# Patient Record
Sex: FEMALE | URBAN - METROPOLITAN AREA
[De-identification: names, ages, dates, MRNs, and addresses within clinical notes are randomized per-mention and may not be internally consistent; named-entity substitution may affect disease eponyms.]

---

## 2018-05-09 ENCOUNTER — APPOINTMENT (RX ONLY)
Dept: URBAN - METROPOLITAN AREA CLINIC 100 | Facility: CLINIC | Age: 83
Setting detail: DERMATOLOGY
End: 2018-05-09

## 2018-05-09 DIAGNOSIS — Z41.9 ENCOUNTER FOR PROCEDURE FOR PURPOSES OTHER THAN REMEDYING HEALTH STATE, UNSPECIFIED: ICD-10-CM

## 2018-05-09 PROCEDURE — ? FILLERS

## 2018-05-09 PROCEDURE — ? DYSPORT

## 2018-05-09 NOTE — PROCEDURE: DYSPORT
Lateral Platysmal Bands Units: 0
Additional Area 4 Location: neck bands
Additional Area 3 Location: chin
Additional Area 2 Units: 10
Additional Area 1 Location: glabella
Expiration Date (Month Year): 09/2018
Consent: Written consent obtained. Risks include but not limited to lid/brow ptosis, bruising, swelling, diplopia, temporary effect, incomplete chemical denervation.
Detail Level: Zone
Additional Area 6 Location: neck
Price (Use Numbers Only, No Special Characters Or $): 0.00
Additional Area 1 Units: [address]
Dilution (U/ 0.1cc): 1.5
Lot #: [id number]
Additional Area 2 Location: lateral brows

## 2018-05-09 NOTE — PROCEDURE: FILLERS
Topical Anesthesia?: BLT cream (benzocaine 20%, lidocaine 6%, tetracaine 4%)
Dorsal Hands Filler  Volume In Cc: 0
Additional Area 1 Volume In Cc: 0.5
Post-Care Instructions: Patient instructed to apply ice to reduce swelling.
Use Map Statement For Sites (Optional): No
Anesthesia Type: 1% lidocaine without epinephrine
Filler: Jossue Novak
Lot #: [id number]
Filler: Alexus Pugh
Additional Anesthesia Volume In Cc: 6
Additional Area 1 Volume In Cc: 1
Price (Use Numbers Only, No Special Characters Or $): 0.00
Expiration Date (Month Year): 08/04/2019
Map Statment: See [address] for Complete Details
Anesthesia Volume In Cc: 0.2
Expiration Date (Month Year): 08/31/2020
Additional Area 1 Location: perioral fine lines,lateral mouth, marionette lines
Detail Level: Zone
Additional Area 1 Location: lateral jawline, lateral mouth, marionette lines
Lot #: [id number]
Consent: Written consent obtained. Risks include but not limited to bruising, beading, irregular texture, ulceration, infection, allergic reaction, scar formation, incomplete augmentation, temporary nature, procedural pain.